# Patient Record
Sex: MALE | Race: BLACK OR AFRICAN AMERICAN | NOT HISPANIC OR LATINO | Employment: FULL TIME | URBAN - METROPOLITAN AREA
[De-identification: names, ages, dates, MRNs, and addresses within clinical notes are randomized per-mention and may not be internally consistent; named-entity substitution may affect disease eponyms.]

---

## 2022-02-24 ENCOUNTER — OFFICE VISIT (OUTPATIENT)
Dept: FAMILY MEDICINE | Facility: CLINIC | Age: 42
End: 2022-02-24

## 2022-02-24 VITALS
RESPIRATION RATE: 16 BRPM | WEIGHT: 175 LBS | SYSTOLIC BLOOD PRESSURE: 159 MMHG | DIASTOLIC BLOOD PRESSURE: 78 MMHG | HEART RATE: 71 BPM | TEMPERATURE: 97.9 F | OXYGEN SATURATION: 99 %

## 2022-02-24 DIAGNOSIS — Z13.220 SCREENING FOR HYPERLIPIDEMIA: ICD-10-CM

## 2022-02-24 DIAGNOSIS — M75.102 ROTATOR CUFF SYNDROME OF BOTH SHOULDERS: Primary | ICD-10-CM

## 2022-02-24 DIAGNOSIS — R03.0 ELEVATED BLOOD PRESSURE READING WITHOUT DIAGNOSIS OF HYPERTENSION: ICD-10-CM

## 2022-02-24 DIAGNOSIS — Z11.59 NEED FOR HEPATITIS C SCREENING TEST: ICD-10-CM

## 2022-02-24 DIAGNOSIS — M75.101 ROTATOR CUFF SYNDROME OF BOTH SHOULDERS: Primary | ICD-10-CM

## 2022-02-24 DIAGNOSIS — Z11.4 SCREENING FOR HIV (HUMAN IMMUNODEFICIENCY VIRUS): ICD-10-CM

## 2022-02-24 PROCEDURE — 99214 OFFICE O/P EST MOD 30 MIN: CPT | Performed by: FAMILY MEDICINE

## 2022-02-24 NOTE — PROGRESS NOTES
Assessment & Plan     Screening for HIV (human immunodeficiency virus)    - HIV Antigen Antibody Combo; Future    Need for hepatitis C screening test    - Hepatitis C Screen Reflex to HCV RNA Quant and Genotype; Future    Screening for hyperlipidemia    - Lipid panel reflex to direct LDL Fasting; Future  - Glucose; Future    Rotator cuff syndrome of both shoulders  Check below labs given the symmetrical symptoms and association with hand cramp in the morning and stiffness.  If labs are normal, then refer to PT, meanwhile, may use tylenol as needed.  - ESR: Erythrocyte sedimentation rate; Future  - CRP, inflammation; Future  - Rheumatoid factor; Future  - Physical Therapy Referral; Future    Elevated blood pressure reading without diagnosis of hypertension  Recheck in 2 weeks.                  Follow up in 2 weeks.  Bulmaro Wyman MD  [facility] NIXON Quintana is a 41 year old who presents for the following health issues     History of Present Illness     Reason for visit:  Shoulder pain  Symptom onset:  More than a month  Symptom intensity:  Severe  Symptom progression:  Worsening  Had these symptoms before:  No  What makes it worse:  No  What makes it better:  No    He eats 0-1 servings of fruits and vegetables daily.He consumes 1 sweetened beverage(s) daily.He exercises with enough effort to increase his heart rate 9 or less minutes per day.  He exercises with enough effort to increase his heart rate 3 or less days per week.   He is taking medications regularly.        Joint Pain    Onset: X2 months     Description:   Location: left shoulder patient states that when he wakes up in the morning his right hand is numb and hard to close   Character: Sharp    Intensity: 8/10    Progression of Symptoms: worse    Accompanying Signs & Symptoms:  Other symptoms: none    History:   Previous similar pain: no       Precipitating factors:   Trauma or overuse: YES- From working out.  Pt remember  he was at the gym with his children.  The pain is in the left shoulder more than right shoulder, worse when he sleeps on it, also he has been noticing tightness in the right side.     Alleviating factors:  Improved by: rest/inactivity  Worse :  When he is pulling or lifting his arm above.   Therapies Tried and outcome: none.    Pt noticed to have high blood pressure today, he denies any symptoms but said that his mother has high blood pressure, and he was drinking coffee today.                     Review of Systems   CONSTITUTIONAL: NEGATIVE for fever, chills, change in weight  RESP: NEGATIVE for significant cough or SOB  CV: NEGATIVE for chest pain, palpitations or peripheral edema      Objective    There were no vitals taken for this visit.  There is no height or weight on file to calculate BMI.  Physical Exam   GENERAL: healthy, alert and no distress  RESP: lungs clear to auscultation - no rales, rhonchi or wheezes  CV: regular rate and rhythm, normal S1 S2, no S3 or S4, no murmur, click or rub, no peripheral edema and peripheral pulses strong  Shoulder exam:inspection: normal.  Palpation : no joint tenderness.  ROM showed :Abduction:painful, especially on the left at 90  Anterior rotation:similar to above.   Internal rotation:normal   Rotator cuff/supraspinatous strength nl  Drop arm sign negative  Impingement test : Neer positive Harrison positive  Instability test : sulcus sign (more than Half inch) nl, apprehension,release sign :nl    Hand exam : normal ROM of the fingers, wrists, and no swelling or bogginess of the joints.

## 2022-02-24 NOTE — LETTER
[facility]  [address] [facility] [address]  Phone: [phone]    February 24, 2022        Lilian Baptiste  [address]  [address] [facility] [address]          To whom it may concern:    RE: Lilian Baptiste    Patient was seen and treated today at our clinic and missed work.    Please contact me for questions or concerns.      Sincerely,        Bulmaro Wyman MD

## 2022-04-03 ENCOUNTER — HEALTH MAINTENANCE LETTER (OUTPATIENT)
Age: 42
End: 2022-04-03

## 2022-10-03 ENCOUNTER — HEALTH MAINTENANCE LETTER (OUTPATIENT)
Age: 42
End: 2022-10-03

## 2023-05-21 ENCOUNTER — HEALTH MAINTENANCE LETTER (OUTPATIENT)
Age: 43
End: 2023-05-21

## 2024-04-23 ENCOUNTER — OFFICE VISIT (OUTPATIENT)
Dept: URGENT CARE | Facility: URGENT CARE | Age: 44
End: 2024-04-23
Payer: COMMERCIAL

## 2024-04-23 ENCOUNTER — TELEPHONE (OUTPATIENT)
Dept: FAMILY MEDICINE | Facility: CLINIC | Age: 44
End: 2024-04-23

## 2024-04-23 VITALS
SYSTOLIC BLOOD PRESSURE: 148 MMHG | HEART RATE: 73 BPM | DIASTOLIC BLOOD PRESSURE: 74 MMHG | WEIGHT: 165 LBS | OXYGEN SATURATION: 100 % | TEMPERATURE: 97.8 F

## 2024-04-23 DIAGNOSIS — R05.1 ACUTE COUGH: Primary | ICD-10-CM

## 2024-04-23 DIAGNOSIS — R63.1 INCREASED THIRST: ICD-10-CM

## 2024-04-23 DIAGNOSIS — E11.9 NEWLY DIAGNOSED DIABETES (H): ICD-10-CM

## 2024-04-23 LAB
GLUCOSE BLD-MCNC: 398 MG/DL (ref 60–99)
HBA1C MFR BLD: 13 % (ref 0–5.6)

## 2024-04-23 PROCEDURE — 82947 ASSAY GLUCOSE BLOOD QUANT: CPT | Performed by: PHYSICIAN ASSISTANT

## 2024-04-23 PROCEDURE — 83036 HEMOGLOBIN GLYCOSYLATED A1C: CPT | Performed by: PHYSICIAN ASSISTANT

## 2024-04-23 PROCEDURE — 99203 OFFICE O/P NEW LOW 30 MIN: CPT | Performed by: PHYSICIAN ASSISTANT

## 2024-04-23 PROCEDURE — 36415 COLL VENOUS BLD VENIPUNCTURE: CPT | Performed by: PHYSICIAN ASSISTANT

## 2024-04-23 RX ORDER — BENZONATATE 200 MG/1
200 CAPSULE ORAL 3 TIMES DAILY PRN
Qty: 30 CAPSULE | Refills: 0 | Status: SHIPPED | OUTPATIENT
Start: 2024-04-23

## 2024-04-23 NOTE — TELEPHONE ENCOUNTER
Reason for Call:  Appointment Request    Patient requesting this type of appt:  Hospital/ED Follow-Up     Requested provider:  any    Reason patient unable to be scheduled: Not within requested timeframe    When does patient want to be seen/preferred time:  3-5 days     Comments: We received a referral for patient to be seen for follow-up in 3-5 days. Can he be worked in [facility]?    Could we send this information to you in [facility] or would you prefer to receive a phone call?:   No preference   Okay to leave a detailed message?: Yes at Home number on file [phone] (home)    Call taken on 4/23/2024 at 2:08 PM by Patito Jacobsen

## 2024-04-23 NOTE — TELEPHONE ENCOUNTER
I tried calling pt at number provided and on file, no answer or voicemail set up to leave a voice mail.  [facility] has limited appointments this week and Dr. Wyman is completely booked.   Mandy King, TC

## 2024-04-23 NOTE — TELEPHONE ENCOUNTER
Patient was notified that we do not have availability at [facility]. He can call his PCP office to check their availability for this urgent referral. He would like to check insurance coverage first as he has new insurance since he was last seen by Dr. Wyman.   Priscilla Pak,

## 2024-04-23 NOTE — PROGRESS NOTES
Assessment & Plan     Acute cough  Acute problem.  On exam patient is in no acute respiratory distress.  Lungs are clear.  Tessalon Perles prescribed as needed for cough.  We discussed viral URI at this time.  Patient educational information provided regarding course of symptoms.  Advised to keep monitoring symptoms.  Follow-up if any worsening symptoms.  Patient agrees with the plan.    - benzonatate (TESSALON) 200 MG capsule  Dispense: 30 capsule; Refill: 0      Newly diagnosed Diabetes  Patient hemoglobin A1c resulted elevated at 13 today.  Blood glucose elevated at 398.  On exam he is in no acute distress.  Metformin is prescribed today.  Recommended follow-up with PCP/diabetes educator, for further management. He needs to have a metabolic panel checked. He agrees.    -Primary care coordination referral  -Primary care referral  -Metformin      Increased thirst  - Glucose, whole blood  - Hemoglobin A1c       Return in about 1 week (around 4/30/2024) for Symptoms failing to improve.    FLORINDA Flores [facility] URGENT CARE CAMILA Quintana is a 44 year old male who presents to clinic today for the following health issues:  Chief Complaint   Patient presents with    Urgent Care     X2 Days cough, non productive, runny nose, congestion, headache but better, fatigued, mild throat pain   Taking tylenol      HPI      URI Adult    Onset of symptoms was 2 day(s) ago.  Course of illness is same.    Severity moderate  Current and Associated symptoms: runny nose and cough - non-productive, fever yesterday, chest congestion  No v/d. No CP. No SOB.  Treatment measures tried include Tylenol  Predisposing factors include ill contact: Family member .      Patient would also like to have his blood sugars checked today.  He notes increased thirst for the past several days.  Patient reports a family history of diabetes.  He is worried about it.       Review of Systems  Constitutional, HEENT,  cardiovascular, pulmonary, GI, , musculoskeletal, neuro, skin, endocrine and psych systems are negative, except as otherwise noted.      Objective    BP (!) 148/74   Pulse 73   Temp 97.8  F (36.6  C) (Tympanic)   Wt 74.8 kg (165 lb)   SpO2 100%   Physical Exam   GENERAL: alert and no distress  HENT: ear canals and TM's normal, mouth without ulcers or lesions, throat is moist and pink  RESP: lungs clear to auscultation - no rales, rhonchi or wheezes  CV: regular rate and rhythm, normal S1 S2  MS: no gross musculoskeletal defects noted, no edema  SKIN: no suspicious lesions or rashes    Results for orders placed or performed in visit on 04/23/24 (from the past 24 hour(s))   Glucose, whole blood   Result Value Ref Range    Glucose Whole Blood 398 (H) 60 - 99 mg/dL   Hemoglobin A1c   Result Value Ref Range    Hemoglobin A1C 13.0 (H) 0.0 - 5.6 %    Narrative    Results confirmed by repeat test.

## 2024-04-23 NOTE — LETTER
April 23, 2024      Lilian Baptiste  [address]  [place] [place] [facility] [address]        To Whom It May Concern:    Lilian Baptiste  was seen on 4/23/2024  Please excuse his absence from work 4/23 and 4/24 due to acute medical illness.        Sincerely,        Theresa Ramirez PA-C

## 2024-04-25 ENCOUNTER — PATIENT OUTREACH (OUTPATIENT)
Dept: CARE COORDINATION | Facility: CLINIC | Age: 44
End: 2024-04-25
Payer: COMMERCIAL

## 2024-04-25 NOTE — LETTER
[facility] CARE COORDINATION  [address]  [facility] [address]     April 26, 2024    Lilian Baptiste  [address]  [place] [facility] [address]      Dear Lilian,    I am a clinic care coordinator who works with Bulmaro Wyman MD with the [facility]. I recently tried to call and was unable to reach you. Below is a description of clinic care coordination and how I can further assist you.       The clinic care coordination team is made up of a registered nurse, , financial resource worker and community health worker who understand the health care system. The goal of clinic care coordination is to help you manage your health and improve access to the health care system. Our team works alongside your provider to assist you in determining your health and social needs. We can help you obtain health care and community resources, providing you with necessary information and education. We can work with you through any barriers and develop a care plan that helps coordinate and strengthen the communication between you and your care team.  Our services are voluntary and are offered without charge to you personally.    Please feel free to contact me with any questions or concerns regarding care coordination and what we can offer.      We are focused on providing you with the highest-quality healthcare experience possible.    Sincerely,     Dara Ortega RN Care Coordinator  [facility] [place]  Email: [email or web address]  Phone: [phone]

## 2024-04-25 NOTE — PROGRESS NOTES
Clinic Care Coordination Contact  [facility]    Clinical Data: Care Coordinator Outreach    Outreach Documentation Number of Outreach Attempt   4/25/2024  10:59 AM 1     Unable to leave [facility] due to prompt that mailbox is full.     Plan: Care Coordinator will try to reach patient again in 1-2 business days.    Dara Ortega, RN Care Coordinator  [facility]  Email: [email or web address]  Phone: [phone]

## 2024-04-26 NOTE — PROGRESS NOTES
Clinic Care Coordination Contact  [facility]/Vo[employer]    Clinical Data: Care Coordinator Outreach    Outreach Documentation Number of Outreach Attempt   4/25/2024  10:59 AM 1   4/26/2024   2:09 PM 2       Unable to leave [employer] message due to receiving prompt that mailbox is full.     Plan: Care Coordinator will send care coordination introduction letter with care coordinator contact information and explanation of care coordination services via [employer]. Care Coordinator will do no further outreaches at this time.    Dara Ortega, RN Care Coordinator  [facility]  Email: [email or web address]  Phone: [phone]

## 2024-06-04 ENCOUNTER — NURSE TRIAGE (OUTPATIENT)
Dept: FAMILY MEDICINE | Facility: CLINIC | Age: 44
End: 2024-06-04
Payer: COMMERCIAL

## 2024-06-04 NOTE — TELEPHONE ENCOUNTER
Patient has a blood sugar of 228 this morning.    Patient has no rapid breathing.  No vomiting.    Per protocol patient given home care advice.    Neli Connor RN on 6/4/2024 at 1:24 PM        Reason for Disposition   Blood glucose 240 - 300 mg/dL (13.3 - 16.7 mmol/L)    Additional Information   Negative: Unconscious or difficult to awaken   Negative: Acting confused (e.g., disoriented, slurred speech)   Negative: Very weak (can't stand)   Negative: Sounds like a life-threatening emergency to the triager   Negative: Vomiting and signs of dehydration (e.g., very dry mouth, lightheaded, dark urine)   Negative: Blood glucose > 240 mg/dL (13.3 mmol/L) and rapid breathing   Negative: Blood glucose > 500 mg/dL (27.8 mmol/L)   Negative: Blood glucose > 240 mg/dL (13.3 mmol/L) AND urine ketones moderate-large (or more than 1+)   Negative: Blood glucose > 240 mg/dL (13.3 mmol/L) and blood ketones > 1.4 mmol/L   Negative: Blood glucose > 240 mg/dL (13.3 mmol/L) AND vomiting AND unable to check for ketones (in blood or urine)   Negative: Vomiting lasting > 4 hours   Negative: Patient sounds very sick or weak to the triager   Negative: Fever > 100.4 F (38.0 C)   Negative: Caller has URGENT medication or insulin pump question and triager unable to answer question   Negative: Blood glucose > 400 mg/dL (22.2 mmol/L)   Negative: Blood glucose > 300 mg/dL (16.7 mmol/L) AND two or more times in a row   Negative: Urine ketones moderate - large (or blood ketones > 1.4 mmol/L)   Negative: Symptoms of high blood sugar (e.g., abnormally thirsty, frequent urination, weight loss) and not able to test blood glucose, AND pregnant   Negative: Blood glucose > 240 mg/dL (13.3 mmol/L) AND pregnant   Negative: Symptoms of high blood sugar (e.g., abnormally thirsty, frequent urination, weight loss) and not able to test blood glucose   Negative: New-onset diabetes mellitus suspected (e.g., frequent urination, weak, weight loss)   Negative:  Patient wants to be seen   Negative: Caller has NON-URGENT medication question about med that PCP prescribed and triager unable to answer question   Negative: Blood glucose > 300 mg/dL (16.7 mmol/L)    Protocols used: Diabetes - High Blood Sugar-A-OH

## 2024-06-10 ENCOUNTER — OFFICE VISIT (OUTPATIENT)
Dept: FAMILY MEDICINE | Facility: CLINIC | Age: 44
End: 2024-06-10
Payer: COMMERCIAL

## 2024-06-10 VITALS
OXYGEN SATURATION: 100 % | RESPIRATION RATE: 12 BRPM | HEART RATE: 74 BPM | SYSTOLIC BLOOD PRESSURE: 142 MMHG | WEIGHT: 159.8 LBS | HEIGHT: 69 IN | BODY MASS INDEX: 23.67 KG/M2 | TEMPERATURE: 98.7 F | DIASTOLIC BLOOD PRESSURE: 68 MMHG

## 2024-06-10 DIAGNOSIS — E11.65 TYPE 2 DIABETES MELLITUS WITH HYPERGLYCEMIA, WITHOUT LONG-TERM CURRENT USE OF INSULIN (H): Primary | ICD-10-CM

## 2024-06-10 DIAGNOSIS — I10 BENIGN ESSENTIAL HYPERTENSION: ICD-10-CM

## 2024-06-10 DIAGNOSIS — Z12.11 SCREEN FOR COLON CANCER: ICD-10-CM

## 2024-06-10 LAB
ANION GAP SERPL CALCULATED.3IONS-SCNC: 12 MMOL/L (ref 7–15)
BUN SERPL-MCNC: 12 MG/DL (ref 6–20)
CALCIUM SERPL-MCNC: 10.5 MG/DL (ref 8.6–10)
CHLORIDE SERPL-SCNC: 104 MMOL/L (ref 98–107)
CHOLEST SERPL-MCNC: 123 MG/DL
CREAT SERPL-MCNC: 0.7 MG/DL (ref 0.67–1.17)
CREAT UR-MCNC: 86.6 MG/DL
DEPRECATED HCO3 PLAS-SCNC: 24 MMOL/L (ref 22–29)
EGFRCR SERPLBLD CKD-EPI 2021: >90 ML/MIN/1.73M2
FASTING STATUS PATIENT QL REPORTED: YES
FASTING STATUS PATIENT QL REPORTED: YES
GLUCOSE SERPL-MCNC: 194 MG/DL (ref 70–99)
HDLC SERPL-MCNC: 43 MG/DL
INSULIN SERPL-ACNC: 4.5 UU/ML (ref 2.6–24.9)
LDLC SERPL CALC-MCNC: 67 MG/DL
MICROALBUMIN UR-MCNC: <12 MG/L
MICROALBUMIN/CREAT UR: NORMAL MG/G{CREAT}
NONHDLC SERPL-MCNC: 80 MG/DL
POTASSIUM SERPL-SCNC: 4.3 MMOL/L (ref 3.4–5.3)
SODIUM SERPL-SCNC: 140 MMOL/L (ref 135–145)
TRIGL SERPL-MCNC: 66 MG/DL

## 2024-06-10 PROCEDURE — 82043 UR ALBUMIN QUANTITATIVE: CPT | Performed by: PHYSICIAN ASSISTANT

## 2024-06-10 PROCEDURE — 99214 OFFICE O/P EST MOD 30 MIN: CPT | Performed by: PHYSICIAN ASSISTANT

## 2024-06-10 PROCEDURE — 84681 ASSAY OF C-PEPTIDE: CPT | Performed by: PHYSICIAN ASSISTANT

## 2024-06-10 PROCEDURE — 82570 ASSAY OF URINE CREATININE: CPT | Performed by: PHYSICIAN ASSISTANT

## 2024-06-10 PROCEDURE — 36415 COLL VENOUS BLD VENIPUNCTURE: CPT | Performed by: PHYSICIAN ASSISTANT

## 2024-06-10 PROCEDURE — G2211 COMPLEX E/M VISIT ADD ON: HCPCS | Performed by: PHYSICIAN ASSISTANT

## 2024-06-10 PROCEDURE — 80048 BASIC METABOLIC PNL TOTAL CA: CPT | Performed by: PHYSICIAN ASSISTANT

## 2024-06-10 PROCEDURE — 83525 ASSAY OF INSULIN: CPT | Performed by: PHYSICIAN ASSISTANT

## 2024-06-10 PROCEDURE — 80061 LIPID PANEL: CPT | Performed by: PHYSICIAN ASSISTANT

## 2024-06-10 RX ORDER — LISINOPRIL 2.5 MG/1
2.5 TABLET ORAL DAILY
Qty: 30 TABLET | Refills: 1 | Status: SHIPPED | OUTPATIENT
Start: 2024-06-10 | End: 2024-07-23

## 2024-06-10 ASSESSMENT — PAIN SCALES - GENERAL: PAINLEVEL: NO PAIN (0)

## 2024-06-10 NOTE — PROGRESS NOTES
Assessment & Plan     Newly diagnosed diabetes (H)  New diagnosis of diabetes in April. A1c 13%. Testing obtained today to assess further (patient does not have the typical body habitus for type II diabetes). Until testing back continue Metformin.  Foot exam completed today.  Urine albumin ordered.   Eye exam ordered.  Follow up with diabetes education.  Follow up for repeat A1c in about 1.5 months.  - BASIC METABOLIC PANEL; Future  - Lipid panel reflex to direct LDL Non-fasting; Future  - Albumin Random Urine Quantitative with Creat Ratio; Future  - Adult Eye  Referral; Future  - Adult Diabetes Education  Referral; Future  - C-peptide; Future  - Insulin level; Future  - FOOT EXAM  - metFORMIN (GLUCOPHAGE) 500 MG tablet; Take 1 tablet (500 mg) by mouth 2 times daily (with meals)  - lisinopril (ZESTRIL) 2.5 MG tablet; Take 1 tablet (2.5 mg) by mouth daily  - BASIC METABOLIC PANEL  - Lipid panel reflex to direct LDL Non-fasting  - Albumin Random Urine Quantitative with Creat Ratio  - C-peptide  - Insulin level    Benign essential hypertension  Blood pressure a little high today. This will also help with kidney protection with diabetes diagnosis.  - lisinopril (ZESTRIL) 2.5 MG tablet; Take 1 tablet (2.5 mg) by mouth daily    Screen for colon cancer  Offered to review screening today. He declines.      Jon Quintana is a 44 year old, presenting for the following health issues:  Diabetes        6/10/2024    11:00 AM   Additional Questions   Roomed by fernie hanson   Accompanied by self     History of Present Illness       Diabetes:   He presents for follow up of diabetes.  He is checking home blood glucose one time daily.   He checks blood glucose before meals.  Blood glucose is sometimes over 200 and never under 70.  When his blood glucose is low, the patient is asymptomatic for confusion, blurred vision, lethargy and reports not feeling dizzy, shaky, or weak.   He has no concerns regarding his  "diabetes at this time.   He is not experiencing numbness or burning in feet, excessive thirst, blurry vision, weight changes or redness, sores or blisters on feet. The patient has not had a diabetic eye exam in the last 12 months.      He exercises with enough effort to increase his heart rate 20 to 29 minutes per day.         Diabetes Follow-up    How often are you checking your blood sugar? One time daily  What time of day are you checking your blood sugars (select all that apply)?  Before meals  Have you had any blood sugars above 200?  Yes sometimes  Have you had any blood sugars below 70?  No  What symptoms do you notice when your blood sugar is low?  Lethargy, Blurred vision, and Confusion  What concerns do you have today about your diabetes? None   Do you have any of these symptoms? (Select all that apply)  No numbness or tingling in feet.  No redness, sores or blisters on feet.  No complaints of excessive thirst.  No reports of blurry vision.  No significant changes to weight.  Have you had a diabetic eye exam in the last 12 months? No    He has made some adjusts to his diet and nutrition. He is exercising.        BP Readings from Last 2 Encounters:   06/10/24 (!) 142/68   04/23/24 (!) 148/74     Hemoglobin A1C (%)   Date Value   04/23/2024 13.0 (H)         How many servings of fruits and vegetables do you eat daily?  0-1  On average, how many sweetened beverages do you drink each day (Examples: soda, juice, sweet tea, etc.  Do NOT count diet or artificially sweetened beverages)?   0  How many days per week do you exercise enough to make your heart beat faster? 7  How many minutes a day do you exercise enough to make your heart beat faster? 20 - 29  How many days per week do you miss taking your medication? 0        Objective    BP (!) 142/68   Pulse 74   Temp 98.7  F (37.1  C) (Temporal)   Resp 12   Ht 1.753 m (5' 9\")   Wt 72.5 kg (159 lb 12.8 oz)   SpO2 100%   BMI 23.60 kg/m    Body mass index is " 23.6 kg/m .  Physical Exam   GENERAL: No acute distress  HEENT: Normocephalic  VASCULAR: 2+ DP and PT pulses bilaterally  EXTREMITIES:   Right lower extremity- foot: No obvious deformity, no edema or ecchymosis of the foot. Sensation present with monofilament testing. No ulcers, skin breakdown or open wounds.  Left lower extremity- foot: No obvious deformity, no edema or ecchymosis of the foot. Sensation present with monofilament testing. No ulcers, skin breakdown or open wounds.  NEURO: Alert, non-focal        Signed Electronically by: Sherice Preston PA-C

## 2024-06-11 LAB — C PEPTIDE SERPL-MCNC: 1 NG/ML (ref 0.9–6.9)

## 2024-07-03 ENCOUNTER — VIRTUAL VISIT (OUTPATIENT)
Dept: EDUCATION SERVICES | Facility: CLINIC | Age: 44
End: 2024-07-03
Attending: PHYSICIAN ASSISTANT
Payer: COMMERCIAL

## 2024-07-03 DIAGNOSIS — E11.65 TYPE 2 DIABETES MELLITUS WITH HYPERGLYCEMIA, WITHOUT LONG-TERM CURRENT USE OF INSULIN (H): Primary | ICD-10-CM

## 2024-07-03 PROCEDURE — G0108 DIAB MANAGE TRN  PER INDIV: HCPCS | Mod: 95 | Performed by: FAMILY MEDICINE

## 2024-07-03 NOTE — PATIENT INSTRUCTIONS
Patient instructions:    1.Eat 3 balanced meals each day - Do NOT skip/delay meals - plan ahead.  Use plate method/aim to eat a balanced plate - half your plate fruits/veggies, 1/4 the plate protein and 1/4 plate starch (rice, potato, pasta)  Switch to healthier carbs (whole grains, legumes/beans, fruit, dairy)  Limit to 1 small injera per meal. Add more non starchy veg and protein to your diet.  Limit desserts to special occasions/holidays and do smaller portions.  Keep kitchen/pantry well stocked with healthy snack options like nuts, veggies, fruit, cottage cheese, trail mix etc  Drink more water.      Do not wait longer than 4-5 hours to eat something  Make sure you include protein source with each meal and at bedtime - this has been shown to help with blood glucose elevations     2. Check blood sugars at least 1 time each day: in the morning/2 hours post a meal              Blood Glucose Targets:              1. Fasting and before meal target is 80 - 130              2. 2 hours after a meal target is < 180    Always remember to bring meter and log book to all appointments.     3. Activity really helps improve blood sugars. Try to Incorporate 30 minutes activity into each day - does not need to be all at one time & walking counts!     4. Take diabetes medications as prescribed.  Keep taking your diabetes medications as directed on the bottle and call for a refill when your pills are getting low.     Call with any questions.    Thank you!  Saritha Morales RDN, SATNAM, [facility]   Certified Diabetes Care &   Triage [phone]

## 2024-07-03 NOTE — PROGRESS NOTES
Diabetes Self-Management Education & Support    Presents for: Initial Assessment for new diagnosis    Type of service:  Video Visit      ASSESSMENT:  Initial visit for diabetes education  Fam hx:mom  Works as a  at [employer]  Vastly elevated A1C  Pt denies any s/s hyperglycemia    Was started on 2000 mg metformin daily - tolerating well    SMBG:  Per limited SMBG data, glycemic control is starting to trend in the right direction.  BG for the last 3 days (-7/3):  FB, 174, 157  ** will start washing hands prior to testing now    Does 3 meals daily - injera, beef, beans, cabbage  Has stopped drinking soda, doing more water  Endorses occasional desserts    Used to play soccer in [place]. Walks/runs during break at work.    Education/Discussion: Reviewed diabetes basics, AIC and BS goals, sx and tx of hypo and hyperglycemia, complications of uncontrolled diabetes, general activity and diet guidelines, CHO foods, concept of budgeting and balancing, planning ahead for healthy meals, examples of quick and healthy meals/snacks, plate method for portion control, meter and testing basics: pt expressed understanding.    Discussed the need to eat healthily - mainly vegetables, some meat, limited rice/bread, noodles, exercise, drinking more water, sleep well       Patient's most recent   Lab Results   Component Value Date    A1C 13.0 2024     is not meeting goal of <7.0    PLAN    Goals for Diabetes Care:     1. Eat 3 balanced meals each day - Do NOT skip/delay meals - plan ahead.  Use plate method/aim to eat a balanced plate - half your plate fruits/veggies, 1/4 the plate protein and 1/4 plate starch (rice, potato, pasta)  Switch to healthier carbs (whole grains, legumes/beans, fruit, dairy)  Limit to 1 small injera per meal. Add more non starchy veg and protein to your diet.  Limit desserts to special occasions/holidays and do smaller portions.  Keep kitchen/pantry well stocked with healthy snack  "options like nuts, veggies, fruit, cottage cheese, trail mix etc  Drink more water.      Do not wait longer than 4-5 hours to eat something  Make sure you include protein source with each meal and at bedtime - this has been shown to help with blood glucose elevations     2. Check blood sugars at least 1 time each day: in the morning/2 hours post a meal              Blood Glucose Targets:              1. Fasting and before meal target is 80 - 130              2. 2 hours after a meal target is < 180    Always remember to bring meter and log book to all appointments.     3. Activity really helps improve blood sugars. Try to Incorporate 30 minutes activity into each day - does not need to be all at one time & walking counts!     4. Take diabetes medications as prescribed.  Keep taking your diabetes medications as directed on the bottle and call for a refill when your pills are getting low.       Topics to cover at upcoming visits: Problem Solving and Reducing Risks    Follow-up: 4-6 weeks (sooner if concerns)  PCP:7/23    See Care Plan for co-developed, patient-state behavior change goals.  AVS provided for patient today.    Education Materials Provided:  Living Healthy with Diabetes, My Plate Planner, and CHO foods and serving sizes      SUBJECTIVE/OBJECTIVE:  Presents for: Initial Assessment for new diagnosis  Accompanied by: Self  Diabetes education in the past 24mo: No  Focus of Visit: Assistance w/ making life changes, Self-care behavioral goal setting  Diabetes type: Type 2  Cultural Influences/Ethnic Background:  Not  or     Patient Problem List and Family Medical History reviewed for relevant medical history, current medical status, and diabetes risk factors.    Vitals:  There were no vitals taken for this visit.  Estimated body mass index is 23.6 kg/m  as calculated from the following:    Height as of 6/10/24: 1.753 m (5' 9\").    Weight as of 6/10/24: 72.5 kg (159 lb 12.8 oz).   Last 3 BP:   BP " "Readings from Last 3 Encounters:   06/10/24 (!) 142/68   04/23/24 (!) 148/74   02/24/22 (!) 159/78       History   Smoking Status    Never   Smokeless Tobacco    Never       Labs:  Lab Results   Component Value Date    A1C 13.0 04/23/2024     Lab Results   Component Value Date     06/10/2024     Lab Results   Component Value Date    LDL 67 06/10/2024     Direct Measure HDL   Date Value Ref Range Status   06/10/2024 43 >=40 mg/dL Final   ]  GFR Estimate   Date Value Ref Range Status   06/10/2024 >90 >60 mL/min/1.73m2 Final     No results found for: \"GFRESTBLACK\"  Lab Results   Component Value Date    CR 0.70 06/10/2024     No results found for: \"MICROALBUMIN\"    Healthy Eating:  Healthy Eating Assessed Today: Yes  Meal planning/habits: None  Beverages: Water    Being Active:  Being Active Assessed Today: Yes    Monitoring:  Monitoring Assessed Today: Yes  Times checking blood sugar at home (number): 1  Times checking blood sugar at home (per): Day      Taking Medications:  Diabetes Medication(s)       Biguanides       metFORMIN (GLUCOPHAGE) 500 MG tablet Take 2 tablets (1,000 mg) by mouth 2 times daily (with meals)            Taking Medication Assessed Today: Yes  Current Treatments: Oral Medication (taken by mouth)  Problems taking diabetes medications regularly?: No  Diabetes medication side effects?: No    Problem Solving:                 Reducing Risks:       Healthy Coping:  Informal Support system:: Family  Stage of change: PREPARATION (Decided to change - considering how)  Patient Activation Measure Survey Score:       No data to display                Time Spent: 75 minutes  Encounter Type: Individual    Any diabetes medication dose changes were made via the CDE Protocol per the patient's referring provider. A copy of this encounter was shared with the provider.   "

## 2024-07-03 NOTE — LETTER
7/3/2024         RE: Lilian Baptiste  [address]   [address]  [place] [facility] [place] [facility] [place] [facility] [address]        Dear Colleague,    Thank you for referring your patient, Lilian Baptiste, to the [facility]. Please see a copy of my visit note below.    Diabetes Self-Management Education & Support    Presents for: Initial Assessment for new diagnosis    Type of service:  Video Visit      ASSESSMENT:  Initial visit for diabetes education  Fam hx:mom  Works as a  at post [employer]  Vastly elevated A1C  Pt denies any s/s hyperglycemia    Was started on 2000 mg metformin daily - tolerating well    SMBG:  Per limited SMBG data, glycemic control is starting to trend in the right direction.  BG for the last 3 days (-7/3):  FB, 174, 157  ** will start washing hands prior to testing now    Does 3 meals daily - injera, beef, beans, cabbage  Has stopped drinking soda, doing more water  Endorses occasional desserts    Used to play soccer in [place]. Walks/runs during break at work.    Education/Discussion: Reviewed diabetes basics, AIC and BS goals, sx and tx of hypo and hyperglycemia, complications of uncontrolled diabetes, general activity and diet guidelines, CHO foods, concept of budgeting and balancing, planning ahead for healthy meals, examples of quick and healthy meals/snacks, plate method for portion control, meter and testing basics: pt expressed understanding.    Discussed the need to eat healthily - mainly vegetables, some meat, limited rice/bread, noodles, exercise, drinking more water, sleep well       Patient's most recent   Lab Results   Component Value Date    A1C 13.0 2024     is not meeting goal of <7.0    PLAN    Goals for Diabetes Care:     1. Eat 3 balanced meals each day - Do NOT skip/delay meals - plan ahead.  Use plate method/aim to eat a balanced plate - half your plate fruits/veggies, 1/4 the plate protein and 1/4 plate starch (rice, potato, pasta)  Switch to healthier carbs  (whole grains, legumes/beans, fruit, dairy)  Limit to 1 small injera per meal. Add more non starchy veg and protein to your diet.  Limit desserts to special occasions/holidays and do smaller portions.  Keep kitchen/pantry well stocked with healthy snack options like nuts, veggies, fruit, cottage cheese, trail mix etc  Drink more water.      Do not wait longer than 4-5 hours to eat something  Make sure you include protein source with each meal and at bedtime - this has been shown to help with blood glucose elevations     2. Check blood sugars at least 1 time each day: in the morning/2 hours post a meal              Blood Glucose Targets:              1. Fasting and before meal target is 80 - 130              2. 2 hours after a meal target is < 180    Always remember to bring meter and log book to all appointments.     3. Activity really helps improve blood sugars. Try to Incorporate 30 minutes activity into each day - does not need to be all at one time & walking counts!     4. Take diabetes medications as prescribed.  Keep taking your diabetes medications as directed on the bottle and call for a refill when your pills are getting low.       Topics to cover at upcoming visits: Problem Solving and Reducing Risks    Follow-up: 4-6 weeks (sooner if concerns)  PCP:7/23    See Care Plan for co-developed, patient-state behavior change goals.  AVS provided for patient today.    Education Materials Provided:  Living Healthy with Diabetes, My Plate Planner, and CHO foods and serving sizes      SUBJECTIVE/OBJECTIVE:  Presents for: Initial Assessment for new diagnosis  Accompanied by: Self  Diabetes education in the past 24mo: No  Focus of Visit: Assistance w/ making life changes, Self-care behavioral goal setting  Diabetes type: Type 2  Cultural Influences/Ethnic Background:  Not  or     Patient Problem List and Family Medical History reviewed for relevant medical history, current medical status, and diabetes  "risk factors.    Vitals:  There were no vitals taken for this visit.  Estimated body mass index is 23.6 kg/m  as calculated from the following:    Height as of 6/10/24: 1.753 m (5' 9\").    Weight as of 6/10/24: 72.5 kg (159 lb 12.8 oz).   Last 3 BP:   BP Readings from Last 3 Encounters:   06/10/24 (!) 142/68   04/23/24 (!) 148/74   02/24/22 (!) 159/78       History   Smoking Status     Never   Smokeless Tobacco     Never       Labs:  Lab Results   Component Value Date    A1C 13.0 04/23/2024     Lab Results   Component Value Date     06/10/2024     Lab Results   Component Value Date    LDL 67 06/10/2024     Direct Measure HDL   Date Value Ref Range Status   06/10/2024 43 >=40 mg/dL Final   ]  GFR Estimate   Date Value Ref Range Status   06/10/2024 >90 >60 mL/min/1.73m2 Final     No results found for: \"GFRESTBLACK\"  Lab Results   Component Value Date    CR 0.70 06/10/2024     No results found for: \"MICROALBUMIN\"    Healthy Eating:  Healthy Eating Assessed Today: Yes  Meal planning/habits: None  Beverages: Water    Being Active:  Being Active Assessed Today: Yes    Monitoring:  Monitoring Assessed Today: Yes  Times checking blood sugar at home (number): 1  Times checking blood sugar at home (per): Day      Taking Medications:  Diabetes Medication(s)       Biguanides       metFORMIN (GLUCOPHAGE) 500 MG tablet Take 2 tablets (1,000 mg) by mouth 2 times daily (with meals)            Taking Medication Assessed Today: Yes  Current Treatments: Oral Medication (taken by mouth)  Problems taking diabetes medications regularly?: No  Diabetes medication side effects?: No    Problem Solving:                 Reducing Risks:       Healthy Coping:  Informal Support system:: Family  Stage of change: PREPARATION (Decided to change - considering how)  Patient Activation Measure Survey Score:       No data to display                Time Spent: 75 minutes  Encounter Type: Individual    Any diabetes medication dose changes were made " via the CDE Protocol per the patient's referring provider. A copy of this encounter was shared with the provider.

## 2024-07-23 ENCOUNTER — OFFICE VISIT (OUTPATIENT)
Dept: FAMILY MEDICINE | Facility: CLINIC | Age: 44
End: 2024-07-23
Attending: PHYSICIAN ASSISTANT
Payer: COMMERCIAL

## 2024-07-23 VITALS
TEMPERATURE: 98.2 F | OXYGEN SATURATION: 99 % | BODY MASS INDEX: 23.99 KG/M2 | HEART RATE: 78 BPM | HEIGHT: 69 IN | RESPIRATION RATE: 12 BRPM | WEIGHT: 162 LBS | DIASTOLIC BLOOD PRESSURE: 60 MMHG | SYSTOLIC BLOOD PRESSURE: 140 MMHG

## 2024-07-23 DIAGNOSIS — L98.9 SKIN LESION: ICD-10-CM

## 2024-07-23 DIAGNOSIS — Z12.11 SCREEN FOR COLON CANCER: ICD-10-CM

## 2024-07-23 DIAGNOSIS — E11.65 TYPE 2 DIABETES MELLITUS WITH HYPERGLYCEMIA, WITHOUT LONG-TERM CURRENT USE OF INSULIN (H): Primary | ICD-10-CM

## 2024-07-23 DIAGNOSIS — I10 BENIGN ESSENTIAL HYPERTENSION: ICD-10-CM

## 2024-07-23 PROBLEM — E11.9 DIABETES MELLITUS, TYPE 2 (H): Status: ACTIVE | Noted: 2024-07-23

## 2024-07-23 LAB
HBA1C MFR BLD: 7.9 % (ref 0–5.6)
HOLD SPECIMEN: NORMAL
HOLD SPECIMEN: NORMAL

## 2024-07-23 PROCEDURE — G2211 COMPLEX E/M VISIT ADD ON: HCPCS | Performed by: PHYSICIAN ASSISTANT

## 2024-07-23 PROCEDURE — 83036 HEMOGLOBIN GLYCOSYLATED A1C: CPT | Performed by: PHYSICIAN ASSISTANT

## 2024-07-23 PROCEDURE — 36415 COLL VENOUS BLD VENIPUNCTURE: CPT | Performed by: PHYSICIAN ASSISTANT

## 2024-07-23 PROCEDURE — 99214 OFFICE O/P EST MOD 30 MIN: CPT | Performed by: PHYSICIAN ASSISTANT

## 2024-07-23 RX ORDER — METFORMIN HCL 500 MG
1000 TABLET, EXTENDED RELEASE 24 HR ORAL 2 TIMES DAILY WITH MEALS
Qty: 360 TABLET | Refills: 1 | Status: SHIPPED | OUTPATIENT
Start: 2024-07-23

## 2024-07-23 RX ORDER — LISINOPRIL 2.5 MG/1
2.5 TABLET ORAL DAILY
Qty: 30 TABLET | Refills: 3 | Status: SHIPPED | OUTPATIENT
Start: 2024-07-23

## 2024-07-23 NOTE — PROGRESS NOTES
Assessment & Plan     Type 2 diabetes mellitus with hyperglycemia, without long-term current use of insulin (H)  A1c showing great improvement of A1c. It has been 6 week since starting Metformin and fasting sugars are still a little higher than ideal. He has followed up with diabetes education and is making dietary adjustments that are appropriate.  - Hemoglobin A1c; Future  - Hemoglobin A1c  - lisinopril (ZESTRIL) 2.5 MG tablet; Take 1 tablet (2.5 mg) by mouth daily  - metFORMIN (GLUCOPHAGE XR) 500 MG 24 hr tablet; Take 2 tablets (1,000 mg) by mouth 2 times daily (with meals)    Benign essential hypertension  He has not started. Re-explained reason why it is helpful. Blood pressure not at goal and can help with kidney protection. He is willing to start.   - lisinopril (ZESTRIL) 2.5 MG tablet; Take 1 tablet (2.5 mg) by mouth daily    Skin lesion  Unsure what the lesion is. He reports it has been there for a few days. It is not consistent with herpes, Syphilis or wart at this time. He can try topical agents for symptoms and follow up with dermatology if not improving.  - Adult Dermatology  Referral; Future    Screen for colon cancer  Declines again. Will discuss at next visit.    The longitudinal plan of care for the diagnosis(es)/condition(s) as documented were addressed during this visit. Due to the added complexity in care, I will continue to support Lilian in the subsequent management and with ongoing continuity of care.    Patient Instructions   Can try topical lidocaine (for pain) or topical benadryl (for itching)      Subjective   Lilian is a 44 year old, presenting for the following health issues:  Diabetes        7/23/2024     8:01 AM   Additional Questions   Roomed by Jessica HAZEL         7/23/2024   Declines Weight   Did patient decline having their weight taken? Yes      HPI     Diabetes Follow-up    How often are you checking your blood sugar? One time daily  What time of day are you checking your  "blood sugars (select all that apply)?  Before meals  Have you had any blood sugars above 200?  No  Have you had any blood sugars below 70?  No  What symptoms do you notice when your blood sugar is low?  None  What concerns do you have today about your diabetes? None   Do you have any of these symptoms? (Select all that apply)  No numbness or tingling in feet.  No redness, sores or blisters on feet.  No complaints of excessive thirst.  No reports of blurry vision.  No significant changes to weight.  Have you had a diabetic eye exam in the last 12 months? No- it is scheduled    Fasting blood sugars: 165, 152, 134, 163      BP Readings from Last 2 Encounters:   07/23/24 (!) 140/60   06/10/24 (!) 142/68     Hemoglobin A1C (%)   Date Value   07/23/2024 7.9 (H)   04/23/2024 13.0 (H)     LDL Cholesterol Calculated (mg/dL)   Date Value   06/10/2024 67         How many servings of fruits and vegetables do you eat daily?  2-3  On average, how many sweetened beverages do you drink each day (Examples: soda, juice, sweet tea, etc.  Do NOT count diet or artificially sweetened beverages)?   0  How many days per week do you exercise enough to make your heart beat faster? 3 or less  How many minutes a day do you exercise enough to make your heart beat faster? 30 - 60  How many days per week do you miss taking your medication? 0        Objective    BP (!) 140/60   Pulse 78   Temp 98.2  F (36.8  C) (Oral)   Resp 12   Ht 1.753 m (5' 9\")   Wt 73.5 kg (162 lb)   SpO2 99%   BMI 23.92 kg/m    Body mass index is 23.92 kg/m .  Physical Exam   GENERAL: No acute distress  HEENT: Normocephalic  SKIN: Mei LY present as chaperone: Distal right penile shaft with small papule, slightly erythematous, no vesicles or other rash noted over the penile shaft or head.  NEURO: Alert and non-focal      Results for orders placed or performed in visit on 07/23/24 (from the past 24 hour(s))   Hemoglobin A1c   Result Value Ref Range    Hemoglobin A1C " 7.9 (H) 0.0 - 5.6 %    Narrative    Results confirmed by repeat test.    Extra Tube    Narrative    The following orders were created for panel order Extra Tube.  Procedure                               Abnormality         Status                     ---------                               -----------         ------                     Extra Red Top Tube[[id number]]                               Final result               Extra Green Top (Lithium...[[id number]]                      Final result                 Please view results for these tests on the individual orders.   Extra Red Top Tube   Result Value Ref Range    Hold Specimen JIC    Extra Green Top (Lithium Heparin) Tube   Result Value Ref Range    Hold Specimen JIC            Signed Electronically by: Sherice Preston PA-C

## 2024-07-28 ENCOUNTER — HEALTH MAINTENANCE LETTER (OUTPATIENT)
Age: 44
End: 2024-07-28

## 2024-09-19 ENCOUNTER — OFFICE VISIT (OUTPATIENT)
Dept: OPTOMETRY | Facility: CLINIC | Age: 44
End: 2024-09-19
Attending: PHYSICIAN ASSISTANT
Payer: COMMERCIAL

## 2024-09-19 DIAGNOSIS — E11.9 TYPE 2 DIABETES MELLITUS WITHOUT RETINOPATHY (H): Primary | ICD-10-CM

## 2024-09-19 DIAGNOSIS — E11.65 TYPE 2 DIABETES MELLITUS WITH HYPERGLYCEMIA, WITHOUT LONG-TERM CURRENT USE OF INSULIN (H): ICD-10-CM

## 2024-09-19 PROCEDURE — T1013 SIGN LANG/ORAL INTERPRETER: HCPCS | Mod: U4

## 2024-09-19 PROCEDURE — 92004 COMPRE OPH EXAM NEW PT 1/>: CPT | Performed by: OPTOMETRIST

## 2024-09-19 ASSESSMENT — TONOMETRY
IOP_METHOD: APPLANATION
OD_IOP_MMHG: 16
OS_IOP_MMHG: 16

## 2024-09-19 ASSESSMENT — REFRACTION_MANIFEST: METHOD_AUTOREFRACTION: 1

## 2024-09-19 ASSESSMENT — CUP TO DISC RATIO
OD_RATIO: 0.4
OS_RATIO: 0.5

## 2024-09-19 ASSESSMENT — CONF VISUAL FIELD
OS_NORMAL: 1
OS_INFERIOR_NASAL_RESTRICTION: 0
OD_NORMAL: 1
OD_SUPERIOR_TEMPORAL_RESTRICTION: 0
OS_SUPERIOR_NASAL_RESTRICTION: 0
OD_INFERIOR_NASAL_RESTRICTION: 0
OS_SUPERIOR_TEMPORAL_RESTRICTION: 0
OD_INFERIOR_TEMPORAL_RESTRICTION: 0
METHOD: COUNTING FINGERS
OS_INFERIOR_TEMPORAL_RESTRICTION: 0
OD_SUPERIOR_NASAL_RESTRICTION: 0

## 2024-09-19 ASSESSMENT — EXTERNAL EXAM - RIGHT EYE: OD_EXAM: NORMAL

## 2024-09-19 ASSESSMENT — VISUAL ACUITY
METHOD: SNELLEN - LINEAR
OD_SC: 20/80
OS_SC+: -2
OD_SC: 20/40
OS_SC: 20/60
OS_SC: 20/30

## 2024-09-19 ASSESSMENT — EXTERNAL EXAM - LEFT EYE: OS_EXAM: NORMAL

## 2024-09-19 ASSESSMENT — SLIT LAMP EXAM - LIDS
COMMENTS: NORMAL
COMMENTS: NORMAL

## 2024-09-19 NOTE — PROGRESS NOTES
Chief Complaint   Patient presents with    Diabetic Eye Exam     Accompanied by  on the phone  Lab Results   Component Value Date    A1C 7.9 07/23/2024    A1C 13.0 04/23/2024            Last Eye Exam: 5 months ago - only wants diabetic check today   Dilated Previously: No, side effects of dilation explained today    What are you currently using to see?  does not use glasses or contacts  Was given a progressive addition lens at last exam   Distance Vision Acuity: Satisfied with vision unaided     Near Vision Acuity: Not satisfied unaided     Eye Comfort: good  Do you use eye drops? : No      Lizabeth Westbrook - Optometric Assistant      Medical, surgical and family histories reviewed and updated 9/19/2024.       OBJECTIVE: See Ophthalmology exam    ASSESSMENT:    ICD-10-CM    1. Type 2 diabetes mellitus with hyperglycemia, without long-term current use of insulin (H)  E11.65 Adult Eye  Referral          PLAN:    Lilian Baptiste aware  eye exam results will be sent to [facility].    Pamela Churchill OD

## 2024-09-19 NOTE — PATIENT INSTRUCTIONS
Patient Education   Diabetes weakens the blood vessels all over the body, including the eyes. Damage to the blood vessels in the eyes can cause swelling or bleeding into part of the eye (called the retina). This is called diabetic retinopathy (TACHO-tin--pu-thee). If not treated, this disease can cause vision loss or blindness.   Symptoms may include blurred or distorted vision, but many people have no symptoms. It's important to see your eye doctor regularly to check for problems.   Early treatment and good control can help protect your vision. Here are the things you can do to help prevent vision loss:      1. Keep your blood sugar levels under tight control.      2. Bring high blood pressure under control.      3. No smoking.      4. Have yearly dilated eye exams.

## 2024-09-19 NOTE — LETTER
2024    Lilian Baptiste   1980        To Whom it May Concern;    Please excuse Lilian Baptiste from work/school for a healthcare visit on Sep 19, 2024.    Sincerely,        Pamela Churchill, OD

## 2024-09-19 NOTE — LETTER
9/19/2024      Lilian Baptiste  [address]  [place] [place] [address]      Dear Colleague,    Thank you for referring your patient, Lilian Baptiste, to the [facility]. Please see a copy of my visit note below.    Chief Complaint   Patient presents with     Diabetic Eye Exam     Accompanied by  on the phone  Lab Results   Component Value Date    A1C 7.9 07/23/2024    A1C 13.0 04/23/2024            Last Eye Exam: 5 months ago - only wants diabetic check today   Dilated Previously: No, side effects of dilation explained today    What are you currently using to see?  does not use glasses or contacts  Was given a progressive addition lens at last exam   Distance Vision Acuity: Satisfied with vision unaided     Near Vision Acuity: Not satisfied unaided     Eye Comfort: good  Do you use eye drops? : No      Lizabeth Westbrook - Optometric Assistant      Medical, surgical and family histories reviewed and updated 9/19/2024.       OBJECTIVE: See Ophthalmology exam    ASSESSMENT:    ICD-10-CM    1. Type 2 diabetes mellitus with hyperglycemia, without long-term current use of insulin (H)  E11.65 Adult Eye  Referral          PLAN:    Lilian Baptiste aware  eye exam results will be sent to [facility].    Pamela Churchill OD          Again, thank you for allowing me to participate in the care of your patient.        Sincerely,        Pamela Churchill, OD

## 2024-10-30 ENCOUNTER — OFFICE VISIT (OUTPATIENT)
Dept: DERMATOLOGY | Facility: CLINIC | Age: 44
End: 2024-10-30
Attending: PHYSICIAN ASSISTANT
Payer: COMMERCIAL

## 2024-10-30 DIAGNOSIS — L29.9 PRURITUS: Primary | ICD-10-CM

## 2024-10-30 PROCEDURE — 99204 OFFICE O/P NEW MOD 45 MIN: CPT | Performed by: NURSE PRACTITIONER

## 2024-10-30 RX ORDER — KETOCONAZOLE 20 MG/G
CREAM TOPICAL
Qty: 60 G | Refills: 11 | Status: SHIPPED | OUTPATIENT
Start: 2024-10-30

## 2024-10-30 RX ORDER — HYDROCORTISONE 25 MG/G
OINTMENT TOPICAL 2 TIMES DAILY
Qty: 30 G | Refills: 1 | Status: SHIPPED | OUTPATIENT
Start: 2024-10-30

## 2024-10-30 NOTE — PATIENT INSTRUCTIONS
Once daily apply the ketoconazole 2% cream to the affected area and genitals.  At the same time use hydrocortisone 2.5% ointment to cover the area that tends to get itchy.  Use the hydrocortisone 2.5% another time throughout the day as well so that you are getting this 1 on twice daily.  Do this for at least 2 weeks and longer if necessary.  Once the itching is fully controlled stop the medications.  If the itching tends to recur periodically okay to use these as needed for flares.  Do not use the hydrocortisone consistently as it can thin the skin, maximum use is 1 month consistently at a time.    Follow-up in 4 to 6 weeks only if the itching is not well-controlled or if you are still needing to use the hydrocortisone

## 2024-10-30 NOTE — LETTER
10/30/2024      Lilian Baptiste  [address] [facility] [address]      Dear Colleague,    Thank you for referring your patient, Lilian Baptiste, to the [facility] [place]. Please see a copy of my visit note below.    [facility] Dermatology Note  Encounter Date: Oct 30, 2024  Office Visit     Reviewed patients past medical history and pertinent chart review prior to patients visit today.     Dermatology Problem List:  Pruritus of penis, given ketoconazole 2% cream for once daily use and hydrocortisone 2.5% ointment for twice daily use as needed    ____________________________________________    Assessment & Plan:     Pruritus of the penis, no rash today.  Discussed itch scratch cycle as possible cause as well as intertrigo.  Plan is as follows:  -Ketoconazole 2% cream once daily to the affected area  -Hydrocortisone 2.5% ointment twice daily to the affected area  -Continue these for at least 2 weeks and then discontinue when the pruritus is controlled.  Maximum 1 month use of the hydrocortisone. Counseled about use and side effects of thinning of the skin, striae, erythema, and worsening of rash.       Follow-up in 4 to 6 weeks if not well-controlled or if he is still needing to use the hydrocortisone    Sue Hebert CNP  Dermatology    _______________________________________    CC: Derm Problem (Skin on Penis is very sensitive with itching and burning when clothes come into contact with it. ) and Consult    HPI:  Mr. Lilian Baptiste is a(n) 44 year old male who presents today as a new patient for itching of the penis.  He has had this going on for about 3 months.  He has not seen any rash or lesions causing the itching.  Another provider advised advised him to use lidocaine 4% patches to the area and he says that if the itching is controlled when he has this on but when he takes it off the itching comes back.    Patient is otherwise feeling well, without additional skin  concerns.      Physical Exam:  SKIN: Focused examination of genitals was performed.  -Genital skin appears normal without any erythema, erosions, scaling    - No other lesions of concern on areas examined.     Medications:  Current Outpatient Medications   Medication Sig Dispense Refill     Blood Pressure Monitoring (BLOOD PRESSURE DIGITAL SOLN) KIT        hydrocortisone 2.5 % ointment Apply topically 2 times daily. For 2-3 weeks then stop if itching is resolved. Use as needed BID for flares of itching 30 g 1     ketoconazole (NIZORAL) 2 % external cream Apply to rash daily until rash resolves. Use PRN for flares of itch 60 g 11     metFORMIN (GLUCOPHAGE XR) 500 MG 24 hr tablet Take 2 tablets (1,000 mg) by mouth 2 times daily (with meals) 360 tablet 1     benzonatate (TESSALON) 200 MG capsule Take 1 capsule (200 mg) by mouth 3 times daily as needed for cough (Patient not taking: Reported on 10/30/2024) 30 capsule 0     lisinopril (ZESTRIL) 2.5 MG tablet Take 1 tablet (2.5 mg) by mouth daily (Patient not taking: Reported on 10/30/2024) 30 tablet 3     No current facility-administered medications for this visit.      Past Medical History:   Patient Active Problem List   Diagnosis     Type 2 diabetes mellitus with hyperglycemia, without long-term current use of insulin (H)     Past Medical History:   Diagnosis Date     Diabetes (H)        SIMI Preston PA-C  [address] [place] [address] on close of this encounter.       Again, thank you for allowing me to participate in the care of your patient.        Sincerely,        Annabella Hebert, KALIN CNP

## 2024-10-30 NOTE — PROGRESS NOTES
[facility] Dermatology Note  Encounter Date: Oct 30, 2024  Office Visit     Reviewed patients past medical history and pertinent chart review prior to patients visit today.     Dermatology Problem List:  Pruritus of penis, given ketoconazole 2% cream for once daily use and hydrocortisone 2.5% ointment for twice daily use as needed    ____________________________________________    Assessment & Plan:     Pruritus of the penis, no rash today.  Discussed itch scratch cycle as possible cause as well as intertrigo.  Plan is as follows:  -Ketoconazole 2% cream once daily to the affected area  -Hydrocortisone 2.5% ointment twice daily to the affected area  -Continue these for at least 2 weeks and then discontinue when the pruritus is controlled.  Maximum 1 month use of the hydrocortisone. Counseled about use and side effects of thinning of the skin, striae, erythema, and worsening of rash.       Follow-up in 4 to 6 weeks if not well-controlled or if he is still needing to use the hydrocortisone    Sue Hebert CNP  Dermatology    _______________________________________    CC: Derm Problem (Skin on Penis is very sensitive with itching and burning when clothes come into contact with it. ) and Consult    HPI:  Mr. Lilian Baptiste is a(n) 44 year old male who presents today as a new patient for itching of the penis.  He has had this going on for about 3 months.  He has not seen any rash or lesions causing the itching.  Another provider advised advised him to use lidocaine 4% patches to the area and he says that if the itching is controlled when he has this on but when he takes it off the itching comes back.    Patient is otherwise feeling well, without additional skin concerns.      Physical Exam:  SKIN: Focused examination of genitals was performed.  -Genital skin appears normal without any erythema, erosions, scaling    - No other lesions of concern on areas examined.     Medications:  Current Outpatient  Medications   Medication Sig Dispense Refill    Blood Pressure Monitoring (BLOOD PRESSURE DIGITAL SOLN) KIT       hydrocortisone 2.5 % ointment Apply topically 2 times daily. For 2-3 weeks then stop if itching is resolved. Use as needed BID for flares of itching 30 g 1    ketoconazole (NIZORAL) 2 % external cream Apply to rash daily until rash resolves. Use PRN for flares of itch 60 g 11    metFORMIN (GLUCOPHAGE XR) 500 MG 24 hr tablet Take 2 tablets (1,000 mg) by mouth 2 times daily (with meals) 360 tablet 1    benzonatate (TESSALON) 200 MG capsule Take 1 capsule (200 mg) by mouth 3 times daily as needed for cough (Patient not taking: Reported on 10/30/2024) 30 capsule 0    lisinopril (ZESTRIL) 2.5 MG tablet Take 1 tablet (2.5 mg) by mouth daily (Patient not taking: Reported on 10/30/2024) 30 tablet 3     No current facility-administered medications for this visit.      Past Medical History:   Patient Active Problem List   Diagnosis    Type 2 diabetes mellitus with hyperglycemia, without long-term current use of insulin (H)     Past Medical History:   Diagnosis Date    Diabetes (H)        CC Sherice Preston PA-C  [address] [place] [address] on close of this encounter.

## 2024-12-05 ENCOUNTER — OFFICE VISIT (OUTPATIENT)
Dept: FAMILY MEDICINE | Facility: CLINIC | Age: 44
End: 2024-12-05
Payer: COMMERCIAL

## 2024-12-05 VITALS
BODY MASS INDEX: 24.44 KG/M2 | RESPIRATION RATE: 23 BRPM | WEIGHT: 165 LBS | TEMPERATURE: 98.2 F | OXYGEN SATURATION: 99 % | HEART RATE: 57 BPM | DIASTOLIC BLOOD PRESSURE: 66 MMHG | HEIGHT: 69 IN | SYSTOLIC BLOOD PRESSURE: 144 MMHG

## 2024-12-05 DIAGNOSIS — E11.65 TYPE 2 DIABETES MELLITUS WITH HYPERGLYCEMIA, WITHOUT LONG-TERM CURRENT USE OF INSULIN (H): Primary | ICD-10-CM

## 2024-12-05 DIAGNOSIS — I10 BENIGN ESSENTIAL HYPERTENSION: ICD-10-CM

## 2024-12-05 LAB
EST. AVERAGE GLUCOSE BLD GHB EST-MCNC: 226 MG/DL
HBA1C MFR BLD: 9.5 % (ref 0–5.6)

## 2024-12-05 RX ORDER — GLIPIZIDE 10 MG/1
10 TABLET, FILM COATED, EXTENDED RELEASE ORAL DAILY
Qty: 30 TABLET | Refills: 3 | Status: SHIPPED | OUTPATIENT
Start: 2024-12-05

## 2024-12-05 RX ORDER — METFORMIN HYDROCHLORIDE 500 MG/1
1000 TABLET, EXTENDED RELEASE ORAL 2 TIMES DAILY WITH MEALS
Qty: 360 TABLET | Refills: 1 | Status: SHIPPED | OUTPATIENT
Start: 2024-12-05

## 2024-12-05 RX ORDER — LISINOPRIL 2.5 MG/1
2.5 TABLET ORAL DAILY
Qty: 90 TABLET | Refills: 1 | Status: SHIPPED | OUTPATIENT
Start: 2024-12-05

## 2024-12-05 RX ORDER — LANCETS
EACH MISCELLANEOUS
Qty: 100 EACH | Refills: 6 | Status: SHIPPED | OUTPATIENT
Start: 2024-12-05

## 2024-12-05 NOTE — PROGRESS NOTES
Assessment & Plan     Type 2 diabetes mellitus with hyperglycemia, without long-term current use of insulin (H)  A1c not at goal. He has been taking the metformin as prescribed but perhaps not as diligent with his diet.  He has had eye exam and seen diabetes education.  Tried to send in Jardiance but too expensive.  We will try glipizide instead. I recommended patient start checking sugars.  New glucometer provided today.  Follow-up in 3 months for recheck.  - Hemoglobin A1c; Future  - blood glucose monitoring (NO BRAND SPECIFIED) meter device kit; Use to test blood sugar 1 times daily or as directed. Preferred blood glucose meter OR supplies to accompany: Blood Glucose Monitor Brands: per insurance.  - blood glucose (NO BRAND SPECIFIED) test strip; Use to test blood sugar 1 times daily or as directed. To accompany: Blood Glucose Monitor Brands: per insurance.  - thin (NO BRAND SPECIFIED) lancets; Use with lanceting device. To accompany: Blood Glucose Monitor Brands: per insurance.  - lisinopril (ZESTRIL) 2.5 MG tablet; Take 1 tablet (2.5 mg) by mouth daily.  - metFORMIN (GLUCOPHAGE XR) 500 MG 24 hr tablet; Take 2 tablets (1,000 mg) by mouth 2 times daily (with meals).  - Hemoglobin A1c  - glipiZIDE (GLUCOTROL XL) 10 MG 24 hr tablet; Take 1 tablet (10 mg) by mouth daily.    Benign essential hypertension  Improved upon recheck.  I recommended he restart the lisinopril that was recommended several months ago.  Refill sent today.  - lisinopril (ZESTRIL) 2.5 MG tablet; Take 1 tablet (2.5 mg) by mouth daily.        Jon Quintana is a 44 year old, presenting for the following health issues:  Diabetes        12/5/2024     4:00 PM   Additional Questions   Roomed by Lachelle ADAMSON CMA     [facility]       Diabetes Follow-up    How often are you checking your blood sugar? Once a week  What time of day are you checking your blood sugars (select all that apply)?  Before meals  Have you had any blood sugars above 200?  Yes   Have you  "had any blood sugars below 70?  No  What symptoms do you notice when your blood sugar is low?  None and Not applicable  What concerns do you have today about your diabetes? Blood sugar is often over 200   Do you have any of these symptoms? (Select all that apply)  Blurry vision      BP Readings from Last 2 Encounters:   12/05/24 (!) 144/66   07/23/24 (!) 140/60     Hemoglobin A1C (%)   Date Value   12/05/2024 9.5 (H)   07/23/2024 7.9 (H)     LDL Cholesterol Calculated (mg/dL)   Date Value   06/10/2024 67               Objective    BP (!) 144/66   Pulse 57   Temp 98.2  F (36.8  C) (Oral)   Resp 23   Ht 1.753 m (5' 9\")   Wt 74.8 kg (165 lb)   SpO2 99%   BMI 24.37 kg/m    Body mass index is 24.37 kg/m .  Physical Exam   GENERAL: No acute distress  HEENT: Normocephalic  NEURO: Alert and non-focal      Results for orders placed or performed in visit on 12/05/24 (from the past 24 hours)   Hemoglobin A1c   Result Value Ref Range    Estimated Average Glucose 226 (H) <117 mg/dL    Hemoglobin A1C 9.5 (H) 0.0 - 5.6 %    Narrative    Result confirmed by repeat test            Signed Electronically by: Sherice Preston PA-C    "

## 2025-01-20 ENCOUNTER — TELEPHONE (OUTPATIENT)
Dept: FAMILY MEDICINE | Facility: CLINIC | Age: 45
End: 2025-01-20

## 2025-01-20 NOTE — TELEPHONE ENCOUNTER
Forms/Letter Request    Type of form/letter: FMLA - Unknown      Is Release of Information needed?: No    Do we have the form/letter: Yes: At the [employer] [facility] in the  in basket    Who is the form from? Patient    Where did/will the form come from? Patient or family brought in       When is form/letter needed by: sooner than later    How would you like the form/letter returned: Fax : [phone]    Patient Notified form requests are processed in 5-7 business days:Yes    Could we send this information to you in [employer] or would you prefer to receive a phone call?:   Patient would prefer a phone call   Okay to leave a detailed message?: Yes at Cell number on file:    Telephone Information:   Mobile [phone]

## 2025-01-21 NOTE — TELEPHONE ENCOUNTER
RN called patient to discuss further.   Patient informs he needs FMLA forms completed due to follow-up/ ongoing management of diabetes.   -FMLA form previously completed last year by Sherice Preston PA-C 7/29/24  RN pended per previous and placed in provider basket with copy of previously completed FMLA forms.     Patient is scheduled for follow-up with Dr. Wyman on 3/13/25    RADHIKA Chavez, RN  [facility]

## 2025-01-21 NOTE — TELEPHONE ENCOUNTER
Spoke with patient, he request to NOT fax and place originals up at  for .  I sent copy to [employer] and filed copy in [employer] accordian file.  Mandy King, TC

## 2025-03-12 ENCOUNTER — TELEPHONE (OUTPATIENT)
Dept: DERMATOLOGY | Facility: CLINIC | Age: 45
End: 2025-03-12

## 2025-03-12 ENCOUNTER — OFFICE VISIT (OUTPATIENT)
Dept: DERMATOLOGY | Facility: CLINIC | Age: 45
End: 2025-03-12
Payer: COMMERCIAL

## 2025-03-12 DIAGNOSIS — L24.9 IRRITANT CONTACT DERMATITIS, UNSPECIFIED TRIGGER: ICD-10-CM

## 2025-03-12 DIAGNOSIS — L29.9 PRURITUS: Primary | ICD-10-CM

## 2025-03-12 PROCEDURE — 99213 OFFICE O/P EST LOW 20 MIN: CPT | Performed by: STUDENT IN AN ORGANIZED HEALTH CARE EDUCATION/TRAINING PROGRAM

## 2025-03-12 RX ORDER — TACROLIMUS 1 MG/G
OINTMENT TOPICAL
Qty: 60 G | Refills: 2 | Status: SHIPPED | OUTPATIENT
Start: 2025-03-12

## 2025-03-12 RX ORDER — TACROLIMUS 1 MG/G
OINTMENT TOPICAL
Qty: 60 G | Refills: 2 | Status: SHIPPED | OUTPATIENT
Start: 2025-03-12 | End: 2025-03-12

## 2025-03-12 NOTE — LETTER
3/12/2025      Lilian Baptiste  [address] [facility] [address]      Dear Colleague,    Thank you for referring your patient, Lilian Baptiste, to the [facility]. Please see a copy of my visit note below.    [facility] Dermatology Note  Encounter Date: Mar 12, 2025  Office Visit     Reviewed patients past medical history and pertinent chart review prior to patients visit today.     Dermatology Problem List:  # Pruritus, penis  -Current tx: tacrolimus ointment  -Prior tx: Ketoconazole cream, hydrocortisone cream  ____________________________________________    Assessment & Plan:     # Pruritus of the penis  - Although no rash is present today, I do wonder about a possible irritant contact dermatitis or frictional dermatitis given his pruritus is present and worsened by coming into contact with his clothing.   - We will trial tacrolimus 0.1% ointment BID to the affected area for his pruritus.     Follow up: in 1 month if no improvement     All risks, benefits and alternatives were discussed with patient.  Patient is in agreement and understands the assessment and plan.  All questions were answered.  Bibiana Ruff PA-C  [facility] Dermatology  _______________________________________    CC: Derm Problem (Follow-up pruritis)     HPI:  Mr. Lilian Baptiste is a(n) 44 year old male who presents today as a return patient for pruritus. He was last seen in dermatology on 10/30/2024 for pruritus of the penis. He was treated with ketoconazole 2% cream and hydrocortisone 2.5% ointment.    Patient stopped using the ketoconazole and hydrocortisone topicals after only using these medications for a few days as they were not helpful. His itch has continued to persist. The itching has been ongoing for 5-6 months now. He also notes having a burning sensation when he scratches. He does not have any abnormal urinary symptoms. His itch is present almost every day and is  aggravated/precipitated by anything that touches the area including his clothing. It is isolated to one region of his penile shaft. He has previously used lidocaine patches in the past which has helped to control the itch but the symptoms will return after he stops using it. He currently tapes the area and uses plaster to prevent anything from rubbing the region. He has not noticed any significant rashes at this site.    Patient is otherwise feeling well, without additional skin concerns.    Physical Exam:  SKIN: Focused examination of the genitalia only was performed.  - Penis and scrotum, normal exam without any rashes, erythema, scaling, erosions, ulcerations, vesicles, or other, no sign of scabies    - No other lesions of concern on areas examined.     Medications:  Current Outpatient Medications   Medication Sig Dispense Refill     blood glucose (NO BRAND SPECIFIED) lancets standard Use to test blood sugar 1 times daily or as directed 100 each 1     blood glucose (NO BRAND SPECIFIED) test strip Use to test blood sugar 1 times daily or as directed. To accompany: Blood Glucose Monitor Brands: per insurance. 100 strip 6     blood glucose monitoring (NO BRAND SPECIFIED) meter device kit Use to test blood sugar 1 times daily or as directed. Preferred blood glucose meter OR supplies to accompany: Blood Glucose Monitor Brands: per insurance. 1 kit 0     Blood Pressure Monitoring (BLOOD PRESSURE DIGITAL SOLN) KIT  (Patient not taking: Reported on 12/5/2024)       glipiZIDE (GLUCOTROL XL) 10 MG 24 hr tablet Take 1 tablet (10 mg) by mouth daily. 30 tablet 3     hydrocortisone 2.5 % ointment Apply topically 2 times daily. For 2-3 weeks then stop if itching is resolved. Use as needed BID for flares of itching 30 g 1     ketoconazole (NIZORAL) 2 % external cream Apply to rash daily until rash resolves. Use PRN for flares of itch 60 g 11     lisinopril (ZESTRIL) 2.5 MG tablet Take 1 tablet (2.5 mg) by mouth daily. 90 tablet  1     metFORMIN (GLUCOPHAGE XR) 500 MG 24 hr tablet Take 2 tablets (1,000 mg) by mouth 2 times daily (with meals). 360 tablet 1     No current facility-administered medications for this visit.      Past Medical History:   Patient Active Problem List   Diagnosis     Type 2 diabetes mellitus with hyperglycemia, without long-term current use of insulin (H)     Past Medical History:   Diagnosis Date     Diabetes (H)        CC Referred Self, MD  No address on file on close of this encounter.       Again, thank you for allowing me to participate in the care of your patient.        Sincerely,        Lidia Ruff PA-C    Electronically signed

## 2025-03-12 NOTE — TELEPHONE ENCOUNTER
Retail Pharmacy Prior Authorization Team   Phone: [phone]    PRIOR AUTHORIZATION DENIED    Medication: TACROLIMUS 0.1 % EX OINT  Insurance Company: [employer] - Phone [phone] Fax [phone]  Denial Date: 3/12/2025  Denial Reason(s): MUST HAVE A COVERED CONDITION      Appeal Information: IF THE PROVIDER WOULD LIKE TO APPEAL THIS DECISION PLEASE PROVIDE THE PA TEAM WITH A LETTER OF MEDICAL NECESSITY      Patient Notified: NO

## 2025-03-12 NOTE — PROGRESS NOTES
[facility] Dermatology Note  Encounter Date: Mar 12, 2025  Office Visit     Reviewed patients past medical history and pertinent chart review prior to patients visit today.     Dermatology Problem List:  # Pruritus, penis  -Current tx: tacrolimus ointment  -Prior tx: Ketoconazole cream, hydrocortisone cream  ____________________________________________    Assessment & Plan:     # Pruritus of the penis  - Although no rash is present today, I do wonder about a possible irritant contact dermatitis or frictional dermatitis given his pruritus is present and worsened by coming into contact with his clothing.   - We will trial tacrolimus 0.1% ointment BID to the affected area for his pruritus.     Follow up: in 1 month if no improvement     All risks, benefits and alternatives were discussed with patient.  Patient is in agreement and understands the assessment and plan.  All questions were answered.  Bibiana Ruff PA-C  [facility] Dermatology  _______________________________________    CC: Derm Problem (Follow-up pruritis)     HPI:  Mr. Lilian Baptiste is a(n) 44 year old male who presents today as a return patient for pruritus. He was last seen in dermatology on 10/30/2024 for pruritus of the penis. He was treated with ketoconazole 2% cream and hydrocortisone 2.5% ointment.    Patient stopped using the ketoconazole and hydrocortisone topicals after only using these medications for a few days as they were not helpful. His itch has continued to persist. The itching has been ongoing for 5-6 months now. He also notes having a burning sensation when he scratches. He does not have any abnormal urinary symptoms. His itch is present almost every day and is aggravated/precipitated by anything that touches the area including his clothing. It is isolated to one region of his penile shaft. He has previously used lidocaine patches in the past which has helped to control the itch but the symptoms will return  after he stops using it. He currently tapes the area and uses plaster to prevent anything from rubbing the region. He has not noticed any significant rashes at this site.    Patient is otherwise feeling well, without additional skin concerns.    Physical Exam:  SKIN: Focused examination of the genitalia only was performed.  - Penis and scrotum, normal exam without any rashes, erythema, scaling, erosions, ulcerations, vesicles, or other, no sign of scabies    - No other lesions of concern on areas examined.     Medications:  Current Outpatient Medications   Medication Sig Dispense Refill    blood glucose (NO BRAND SPECIFIED) lancets standard Use to test blood sugar 1 times daily or as directed 100 each 1    blood glucose (NO BRAND SPECIFIED) test strip Use to test blood sugar 1 times daily or as directed. To accompany: Blood Glucose Monitor Brands: per insurance. 100 strip 6    blood glucose monitoring (NO BRAND SPECIFIED) meter device kit Use to test blood sugar 1 times daily or as directed. Preferred blood glucose meter OR supplies to accompany: Blood Glucose Monitor Brands: per insurance. 1 kit 0    Blood Pressure Monitoring (BLOOD PRESSURE DIGITAL SOLN) KIT  (Patient not taking: Reported on 12/5/2024)      glipiZIDE (GLUCOTROL XL) 10 MG 24 hr tablet Take 1 tablet (10 mg) by mouth daily. 30 tablet 3    hydrocortisone 2.5 % ointment Apply topically 2 times daily. For 2-3 weeks then stop if itching is resolved. Use as needed BID for flares of itching 30 g 1    ketoconazole (NIZORAL) 2 % external cream Apply to rash daily until rash resolves. Use PRN for flares of itch 60 g 11    lisinopril (ZESTRIL) 2.5 MG tablet Take 1 tablet (2.5 mg) by mouth daily. 90 tablet 1    metFORMIN (GLUCOPHAGE XR) 500 MG 24 hr tablet Take 2 tablets (1,000 mg) by mouth 2 times daily (with meals). 360 tablet 1     No current facility-administered medications for this visit.      Past Medical History:   Patient Active Problem List    Diagnosis    Type 2 diabetes mellitus with hyperglycemia, without long-term current use of insulin (H)     Past Medical History:   Diagnosis Date    Diabetes (H)        CC Referred Self, MD  No address on file on close of this encounter.

## 2025-03-16 ENCOUNTER — HEALTH MAINTENANCE LETTER (OUTPATIENT)
Age: 45
End: 2025-03-16

## 2025-04-24 ENCOUNTER — OFFICE VISIT (OUTPATIENT)
Dept: FAMILY MEDICINE | Facility: CLINIC | Age: 45
End: 2025-04-24
Payer: COMMERCIAL

## 2025-04-24 ENCOUNTER — ORDERS ONLY (AUTO-RELEASED) (OUTPATIENT)
Dept: FAMILY MEDICINE | Facility: CLINIC | Age: 45
End: 2025-04-24

## 2025-04-24 VITALS
HEART RATE: 60 BPM | DIASTOLIC BLOOD PRESSURE: 80 MMHG | BODY MASS INDEX: 23.7 KG/M2 | SYSTOLIC BLOOD PRESSURE: 136 MMHG | WEIGHT: 160 LBS | HEIGHT: 69 IN | TEMPERATURE: 97.7 F | RESPIRATION RATE: 16 BRPM | OXYGEN SATURATION: 100 %

## 2025-04-24 DIAGNOSIS — Z11.59 NEED FOR HEPATITIS C SCREENING TEST: ICD-10-CM

## 2025-04-24 DIAGNOSIS — Z12.11 SCREEN FOR COLON CANCER: ICD-10-CM

## 2025-04-24 DIAGNOSIS — Z11.4 SCREENING FOR HIV (HUMAN IMMUNODEFICIENCY VIRUS): ICD-10-CM

## 2025-04-24 DIAGNOSIS — E11.65 TYPE 2 DIABETES MELLITUS WITH HYPERGLYCEMIA, WITHOUT LONG-TERM CURRENT USE OF INSULIN (H): ICD-10-CM

## 2025-04-24 DIAGNOSIS — Z00.00 ROUTINE GENERAL MEDICAL EXAMINATION AT A HEALTH CARE FACILITY: Primary | ICD-10-CM

## 2025-04-24 LAB
EST. AVERAGE GLUCOSE BLD GHB EST-MCNC: 263 MG/DL
HBA1C MFR BLD: 10.8 % (ref 0–5.6)

## 2025-04-24 SDOH — HEALTH STABILITY: PHYSICAL HEALTH: ON AVERAGE, HOW MANY MINUTES DO YOU ENGAGE IN EXERCISE AT THIS LEVEL?: 10 MIN

## 2025-04-24 SDOH — HEALTH STABILITY: PHYSICAL HEALTH: ON AVERAGE, HOW MANY DAYS PER WEEK DO YOU ENGAGE IN MODERATE TO STRENUOUS EXERCISE (LIKE A BRISK WALK)?: 0 DAYS

## 2025-04-24 ASSESSMENT — SOCIAL DETERMINANTS OF HEALTH (SDOH): HOW OFTEN DO YOU GET TOGETHER WITH FRIENDS OR RELATIVES?: NEVER

## 2025-04-24 NOTE — PATIENT INSTRUCTIONS
Patient Education   Preventive Care Advice   This is general advice given by our system to help you stay healthy. However, your care team may have specific advice just for you. Please talk to your care team about your preventive care needs.  Nutrition  Eat 5 or more servings of fruits and vegetables each day.  Try wheat bread, brown rice and whole grain pasta (instead of white bread, rice, and pasta).  Get enough calcium and vitamin D. Check the label on foods and aim for 100% of the RDA (recommended daily allowance).  Lifestyle  Exercise at least 150 minutes each week  (30 minutes a day, 5 days a week).  Do muscle strengthening activities 2 days a week. These help control your weight and prevent disease.  No smoking.  Wear sunscreen to prevent skin cancer.  Have a dental exam and cleaning every 6 months.  Yearly exams  See your health care team every year to talk about:  Any changes in your health.  Any medicines your care team has prescribed.  Preventive care, family planning, and ways to prevent chronic diseases.  Shots (vaccines)   HPV shots (up to age 26), if you've never had them before.  Hepatitis B shots (up to age 59), if you've never had them before.  COVID-19 shot: Get this shot when it's due.  Flu shot: Get a flu shot every year.  Tetanus shot: Get a tetanus shot every 10 years.  Pneumococcal, hepatitis A, and RSV shots: Ask your care team if you need these based on your risk.  Shingles shot (for age 50 and up)  General health tests  Diabetes screening:  Starting at age 35, Get screened for diabetes at least every 3 years.  If you are younger than age 35, ask your care team if you should be screened for diabetes.  Cholesterol test: At age 39, start having a cholesterol test every 5 years, or more often if advised.  Bone density scan (DEXA): At age 50, ask your care team if you should have this scan for osteoporosis (brittle bones).  Hepatitis C: Get tested at least once in your life.  STIs (sexually  transmitted infections)  Before age 24: Ask your care team if you should be screened for STIs.  After age 24: Get screened for STIs if you're at risk. You are at risk for STIs (including HIV) if:  You are sexually active with more than one person.  You don't use condoms every time.  You or a partner was diagnosed with a sexually transmitted infection.  If you are at risk for HIV, ask about PrEP medicine to prevent HIV.  Get tested for HIV at least once in your life, whether you are at risk for HIV or not.  Cancer screening tests  Cervical cancer screening: If you have a cervix, begin getting regular cervical cancer screening tests starting at age 21.  Breast cancer scan (mammogram): If you've ever had breasts, begin having regular mammograms starting at age 40. This is a scan to check for breast cancer.  Colon cancer screening: It is important to start screening for colon cancer at age 45.  Have a colonoscopy test every 10 years (or more often if you're at risk) Or, ask your provider about stool tests like a FIT test every year or Cologuard test every 3 years.  To learn more about your testing options, visit:   .  For help making a decision, visit:   https://bit.ly/ep14910.  Prostate cancer screening test: If you have a prostate, ask your care team if a prostate cancer screening test (PSA) at age 55 is right for you.  Lung cancer screening: If you are a current or former smoker ages 50 to 80, ask your care team if ongoing lung cancer screenings are right for you.  For informational purposes only. Not to replace the advice of your health care provider. Copyright   2023 [facility]. All rights reserved. Clinically reviewed by the [facility]. [employer] 139171 - REV 01/24.  Relationships for Good Health  Relationships are important for our health and happiness. Social isolation, loneliness and lack of support are bad for your health. Studies show that loneliness can harm health  and limit your life span as much as high blood pressure and smoking.   Take some time to reflect on your relationships. Then answer these questions:  Are there people in your life that cause you stress or drain your energy? What can you do to set limits?  ________________________________________________________________________________________________________________________________________________________________________________________________________________________________________________________________________________________________________________________________________________  Who do you enjoy spending time with? Who can you go to for support?  ________________________________________________________________________________________________________________________________________________________________________________________________________________________________________________________________________________________________________________________________________________  What can you do to improve your relationships with others?  __________________________________________________________________________________________________________________________________________________________________________________________________________________  ______________________________________________________________________________________________________________________________  What do you like most about your relationships with others?  ________________________________________________________________________________________________________________________________________________________________________________________________________________________________________________________________________________________________________________________________________________  My goal: ______________________________________________________________________  I will:  ______________________________________________________________________________________________________________________________________________________________________________________________    For informational purposes only. Not to replace the advice of your health care provider. Copyright   2018 [facility]. All rights reserved. Clinically reviewed by [facility]  [facility]. [employer] 780503 - Rev 06/24.

## 2025-04-24 NOTE — PROGRESS NOTES
Preventive Care Visit  [facility]  Bulmaro Wyman MD, Family Medicine  Apr 24, 2025      Assessment & Plan     Routine general medical examination at a health care facility:  - Conduct routine screenings and assessments as part of the general medical examination.    Type 2 diabetes mellitus with hyperglycemia, without long-term current use of insulin (H):  - Blood sugar levels are high, with recent readings of 218 and a past reading of 500. Inconsistent use of glipizide noted.  - Adjust medication regimen to take 4 metformin pills at the same time in the morning. Reinstate glipizide once daily. Check A1c and fasting blood sugar.    Screen for colon cancer:  - No family history of colon cancer reported.  - Perform cologuard for colon cancer screening. Kit to be mailed to the patient's house.    Screening for HIV (human immunodeficiency virus):  - Routine screening.  - Conduct HIV screening test.    Need for hepatitis C screening test:  - Routine screening.  - Conduct hepatitis C screening test.    Consent was obtained from the patient to use an AI documentation tool in the creation of this note.            Counseling  Appropriate preventive services were addressed with this patient via screening, questionnaire, or discussion as appropriate for fall prevention, nutrition, physical activity, Tobacco-use cessation, social engagement, weight loss and cognition.  Checklist reviewing preventive services available has been given to the patient.  Reviewed patient's diet, addressing concerns and/or questions.   Patient is at risk for social isolation and has been provided with information about the benefit of social connection.   The patient was instructed to see the dentist every 6 months.           Subjective   Lilian is a 45 year old, presenting for the following:  Physical        4/24/2025     9:55 AM   Additional Questions   Roomed by Danielle ROBLES  History of Present Illness-  - Lilian PINEDA  Oliva, 45-year-old male.  - Diagnosed with diabetes approximately a year ago.  - Currently taking metformin, 2 pills twice a day, totaling 4 pills daily.  - Occasionally takes glipizide, but not consistently due to forgetfulness and busy schedule.  - Reports difficulty in remembering to take medication regularly, recently set an alarm to help with this.  - Blood sugar levels have been inconsistent; reported levels include 200, 300, and 118, with a high of 500 after eating heavily at night 2-3 months ago.  - Currently, blood sugar level is 218.  - No symptoms associated with high blood sugar, leading to occasional neglect in checking levels.  - Using tacrolimus cream for a rash in the private area, prescribed 1-2 months ago.  - Rash causes itching, no rash reported on hands or other areas.  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  - Cultural background includes [place] [employer]ist practices, which are described as strict.  - No reported vaccinations or shots recently, unsure of last vaccination date.           Advance Care Planning    Discussed advance care planning with patient; informed AVS has link to [facility] [employer].        4/24/2025   General Health   How would you rate your overall physical health? (!) FAIR   Feel stress (tense, anxious, or unable to sleep) Only a little   (!) STRESS CONCERN      4/24/2025   Nutrition   Three or more servings of calcium each day? (!) I DON'T KNOW   Diet: Regular (no restrictions)   How many servings of fruit and vegetables per day? (!) 0-1   How many sweetened beverages each day? 0-1         4/24/2025   Exercise   Days per week of moderate/strenous exercise 0 days   Average minutes spent exercising at this level 10 min   (!) EXERCISE CONCERN      4/24/2025   Social Factors   Frequency of gathering with friends or relatives Never   Worry food won't last until get money to buy more No   Food not last or not  have enough money for food? No   Do you have housing? (Housing is defined as stable permanent housing and does not include staying outside in a car, in a tent, in an abandoned building, in an overnight shelter, or couch-surfing.) No   Are you worried about losing your housing? No   Lack of transportation? No   Unable to get utilities (heat,electricity)? No   Want help with housing or utility concern? No   (!) HOUSING CONCERN PRESENT(!) SOCIAL CONNECTIONS CONCERN      4/24/2025   Dental   Dentist two times every year? (!) NO         Today's PHQ-2 Score:       4/24/2025     9:49 AM   PHQ-2 ( 1999 [employer])   Q1: Little interest or pleasure in doing things 0   Q2: Feeling down, depressed or hopeless 0   PHQ-2 Score 0    Q1: Little interest or pleasure in doing things Not at all   Q2: Feeling down, depressed or hopeless Not at all   PHQ-2 Score 0       Patient-reported           4/24/2025   Substance Use   Alcohol more than 3/day or more than 7/wk No   Do you use any other substances recreationally? No     Social History     Tobacco Use    Smoking status: Never    Smokeless tobacco: Never   Vaping Use    Vaping status: Never Used   Substance Use Topics    Alcohol use: Yes     Comment: oc    Drug use: Never             4/24/2025   One time HIV Screening   Previous HIV test? Yes         4/24/2025   STI Screening   New sexual partner(s) since last STI/HIV test? No   ASCVD Risk   The ASCVD Risk score (Fanny MCALLISTER, et al., 2019) failed to calculate for the following reasons:    The valid total cholesterol range is 130 to 320 mg/dL        4/24/2025   Contraception/Family Planning   Questions about contraception or family planning No        Reviewed and updated as needed this visit by Provider                    Past Medical History:   Diagnosis Date    Diabetes (H)      No past surgical history on file.      Review of Systems  Constitutional, HEENT, cardiovascular, pulmonary, GI, , musculoskeletal, neuro, skin,  "endocrine and psych systems are negative, except as otherwise noted.     Objective    Exam  /80 (BP Location: Left arm, Patient Position: Chair, Cuff Size: Adult Regular)   Pulse 60   Temp 97.7  F (36.5  C) (Oral)   Resp 16   Ht 1.753 m (5' 9\")   Wt 72.6 kg (160 lb)   SpO2 100%   BMI 23.63 kg/m     Estimated body mass index is 23.63 kg/m  as calculated from the following:    Height as of this encounter: 1.753 m (5' 9\").    Weight as of this encounter: 72.6 kg (160 lb).    Physical Exam  GENERAL: alert and no distress  EYES: Eyes grossly normal to inspection, PERRL and conjunctivae and sclerae normal  HENT: ear canals and TM's normal, nose and mouth without ulcers or lesions  NECK: no adenopathy, no asymmetry, masses, or scars  RESP: lungs clear to auscultation - no rales, rhonchi or wheezes  CV: regular rate and rhythm, normal S1 S2, no S3 or S4, no murmur, click or rub, no peripheral edema  ABDOMEN: soft, nontender, no hepatosplenomegaly, no masses and bowel sounds normal  MS: no gross musculoskeletal defects noted, no edema  SKIN: no suspicious lesions or rashes  NEURO: Normal strength and tone, mentation intact and speech normal  PSYCH: mentation appears normal, affect normal/bright        Signed Electronically by: Bulmaro Wyman MD    "

## 2025-08-04 ENCOUNTER — TELEPHONE (OUTPATIENT)
Dept: FAMILY MEDICINE | Facility: CLINIC | Age: 45
End: 2025-08-04

## 2025-08-04 ENCOUNTER — HOSPITAL ENCOUNTER (EMERGENCY)
Facility: CLINIC | Age: 45
Discharge: HOME | End: 2025-08-04
Attending: EMERGENCY MEDICINE | Admitting: EMERGENCY MEDICINE
Payer: COMMERCIAL

## 2025-08-04 VITALS
HEART RATE: 65 BPM | BODY MASS INDEX: 24.36 KG/M2 | RESPIRATION RATE: 22 BRPM | WEIGHT: 164.46 LBS | DIASTOLIC BLOOD PRESSURE: 79 MMHG | OXYGEN SATURATION: 100 % | TEMPERATURE: 98.7 F | HEIGHT: 69 IN | SYSTOLIC BLOOD PRESSURE: 151 MMHG

## 2025-08-04 DIAGNOSIS — E11.65 TYPE 2 DIABETES MELLITUS WITH HYPERGLYCEMIA, WITHOUT LONG-TERM CURRENT USE OF INSULIN (H): ICD-10-CM

## 2025-08-04 DIAGNOSIS — L29.3: Primary | ICD-10-CM

## 2025-08-04 DIAGNOSIS — T14.8XXA SKIN EXCORIATION: ICD-10-CM

## 2025-08-04 DIAGNOSIS — R03.0 ELEVATED BLOOD PRESSURE READING WITHOUT DIAGNOSIS OF HYPERTENSION: ICD-10-CM

## 2025-08-04 LAB — GLUCOSE BLDC GLUCOMTR-MCNC: 277 MG/DL (ref 70–99)

## 2025-08-04 PROCEDURE — 82962 GLUCOSE BLOOD TEST: CPT

## 2025-08-04 PROCEDURE — 99283 EMERGENCY DEPT VISIT LOW MDM: CPT | Performed by: EMERGENCY MEDICINE

## 2025-08-04 ASSESSMENT — COLUMBIA-SUICIDE SEVERITY RATING SCALE - C-SSRS
2. HAVE YOU ACTUALLY HAD ANY THOUGHTS OF KILLING YOURSELF IN THE PAST MONTH?: NO
6. HAVE YOU EVER DONE ANYTHING, STARTED TO DO ANYTHING, OR PREPARED TO DO ANYTHING TO END YOUR LIFE?: NO
1. IN THE PAST MONTH, HAVE YOU WISHED YOU WERE DEAD OR WISHED YOU COULD GO TO SLEEP AND NOT WAKE UP?: NO

## 2025-08-04 ASSESSMENT — ACTIVITIES OF DAILY LIVING (ADL): ADLS_ACUITY_SCORE: 41

## 2025-08-06 ENCOUNTER — OFFICE VISIT (OUTPATIENT)
Dept: DERMATOLOGY | Facility: CLINIC | Age: 45
End: 2025-08-06
Attending: EMERGENCY MEDICINE
Payer: COMMERCIAL

## 2025-08-06 ENCOUNTER — TELEPHONE (OUTPATIENT)
Dept: DERMATOLOGY | Facility: CLINIC | Age: 45
End: 2025-08-06

## 2025-08-06 DIAGNOSIS — L29.3: ICD-10-CM

## 2025-08-06 DIAGNOSIS — F45.8 NEUROGENIC PRURITUS: Primary | ICD-10-CM

## 2025-08-06 DIAGNOSIS — E11.65 TYPE 2 DIABETES MELLITUS WITH HYPERGLYCEMIA, WITHOUT LONG-TERM CURRENT USE OF INSULIN (H): ICD-10-CM

## 2025-08-10 ENCOUNTER — HEALTH MAINTENANCE LETTER (OUTPATIENT)
Age: 45
End: 2025-08-10

## 2025-08-14 ENCOUNTER — OFFICE VISIT (OUTPATIENT)
Dept: FAMILY MEDICINE | Facility: CLINIC | Age: 45
End: 2025-08-14
Payer: COMMERCIAL

## 2025-08-14 ENCOUNTER — TELEPHONE (OUTPATIENT)
Dept: FAMILY MEDICINE | Facility: CLINIC | Age: 45
End: 2025-08-14

## 2025-08-14 VITALS
BODY MASS INDEX: 24.22 KG/M2 | DIASTOLIC BLOOD PRESSURE: 76 MMHG | RESPIRATION RATE: 12 BRPM | WEIGHT: 163.5 LBS | HEIGHT: 69 IN | TEMPERATURE: 98.4 F | OXYGEN SATURATION: 99 % | HEART RATE: 62 BPM | SYSTOLIC BLOOD PRESSURE: 145 MMHG

## 2025-08-14 DIAGNOSIS — L29.3 PRURITUS OF GENITALIA: ICD-10-CM

## 2025-08-14 DIAGNOSIS — E11.65 TYPE 2 DIABETES MELLITUS WITH HYPERGLYCEMIA, WITHOUT LONG-TERM CURRENT USE OF INSULIN (H): Primary | ICD-10-CM

## 2025-08-14 LAB
CHOLEST SERPL-MCNC: 128 MG/DL
CREAT UR-MCNC: 109 MG/DL
EST. AVERAGE GLUCOSE BLD GHB EST-MCNC: 260 MG/DL
FASTING STATUS PATIENT QL REPORTED: NO
HBA1C MFR BLD: 10.7 % (ref 0–5.6)
HDLC SERPL-MCNC: 37 MG/DL
LDLC SERPL CALC-MCNC: 69 MG/DL
MICROALBUMIN UR-MCNC: <12 MG/L
MICROALBUMIN/CREAT UR: NORMAL MG/G{CREAT}
NONHDLC SERPL-MCNC: 91 MG/DL
TRIGL SERPL-MCNC: 108 MG/DL

## 2025-08-22 ENCOUNTER — NURSE TRIAGE (OUTPATIENT)
Dept: NURSING | Facility: CLINIC | Age: 45
End: 2025-08-22
Payer: COMMERCIAL

## 2025-08-22 DIAGNOSIS — E11.65 TYPE 2 DIABETES MELLITUS WITH HYPERGLYCEMIA, WITHOUT LONG-TERM CURRENT USE OF INSULIN (H): ICD-10-CM

## 2025-08-25 RX ORDER — METFORMIN HYDROCHLORIDE 500 MG/1
1000 TABLET, EXTENDED RELEASE ORAL 2 TIMES DAILY WITH MEALS
Qty: 360 TABLET | Refills: 1 | Status: SHIPPED | OUTPATIENT
Start: 2025-08-25